# Patient Record
Sex: MALE | Race: AMERICAN INDIAN OR ALASKA NATIVE
[De-identification: names, ages, dates, MRNs, and addresses within clinical notes are randomized per-mention and may not be internally consistent; named-entity substitution may affect disease eponyms.]

---

## 2019-09-25 ENCOUNTER — HOSPITAL ENCOUNTER (EMERGENCY)
Dept: HOSPITAL 5 - ED | Age: 29
Discharge: HOME | End: 2019-09-25
Payer: SELF-PAY

## 2019-09-25 VITALS — DIASTOLIC BLOOD PRESSURE: 84 MMHG | SYSTOLIC BLOOD PRESSURE: 126 MMHG

## 2019-09-25 DIAGNOSIS — K52.9: Primary | ICD-10-CM

## 2019-09-25 DIAGNOSIS — G89.29: ICD-10-CM

## 2019-09-25 DIAGNOSIS — M54.89: ICD-10-CM

## 2019-09-25 DIAGNOSIS — E86.0: ICD-10-CM

## 2019-09-25 DIAGNOSIS — Z88.6: ICD-10-CM

## 2019-09-25 LAB
ALBUMIN SERPL-MCNC: 4.1 G/DL (ref 3.9–5)
ALT SERPL-CCNC: 13 UNITS/L (ref 7–56)
BASOPHILS # (AUTO): 0.1 K/MM3 (ref 0–0.1)
BASOPHILS NFR BLD AUTO: 1 % (ref 0–1.8)
BUN SERPL-MCNC: 8 MG/DL (ref 9–20)
BUN/CREAT SERPL: 13 %
CALCIUM SERPL-MCNC: 9.1 MG/DL (ref 8.4–10.2)
EOSINOPHIL # BLD AUTO: 0.1 K/MM3 (ref 0–0.4)
EOSINOPHIL NFR BLD AUTO: 1.9 % (ref 0–4.3)
HCT VFR BLD CALC: 40.6 % (ref 35.5–45.6)
HEMOLYSIS INDEX: 4
HGB BLD-MCNC: 13.3 GM/DL (ref 11.8–15.2)
LYMPHOCYTES # BLD AUTO: 1.6 K/MM3 (ref 1.2–5.4)
LYMPHOCYTES NFR BLD AUTO: 26.4 % (ref 13.4–35)
MCHC RBC AUTO-ENTMCNC: 33 % (ref 32–34)
MCV RBC AUTO: 92 FL (ref 84–94)
MONOCYTES # (AUTO): 0.4 K/MM3 (ref 0–0.8)
MONOCYTES % (AUTO): 6.9 % (ref 0–7.3)
PLATELET # BLD: 164 K/MM3 (ref 140–440)
RBC # BLD AUTO: 4.42 M/MM3 (ref 3.65–5.03)

## 2019-09-25 PROCEDURE — 83690 ASSAY OF LIPASE: CPT

## 2019-09-25 PROCEDURE — 96375 TX/PRO/DX INJ NEW DRUG ADDON: CPT

## 2019-09-25 PROCEDURE — 99284 EMERGENCY DEPT VISIT MOD MDM: CPT

## 2019-09-25 PROCEDURE — 96361 HYDRATE IV INFUSION ADD-ON: CPT

## 2019-09-25 PROCEDURE — 74177 CT ABD & PELVIS W/CONTRAST: CPT

## 2019-09-25 PROCEDURE — 96374 THER/PROPH/DIAG INJ IV PUSH: CPT

## 2019-09-25 PROCEDURE — 80053 COMPREHEN METABOLIC PANEL: CPT

## 2019-09-25 PROCEDURE — 36415 COLL VENOUS BLD VENIPUNCTURE: CPT

## 2019-09-25 PROCEDURE — 85025 COMPLETE CBC W/AUTO DIFF WBC: CPT

## 2019-09-25 NOTE — EVENT NOTE
ED Screening Note


Date of service: 09/25/19


Time: 12:50


ED Screening Note: 


28 y o male presents for acute on chronic back pain from scoliosis repair 

surgery a year ago and out of his HIV meds





This initial assessment/diagnostic orders/clinical plan/treatment(s) is/are 

subject to change based on patients health status, clinical progression and re-

assessment by fellow clinical providers in the ED. Further treatment and workup 

at subsequent clinical providers discretion. Patient/guardian urged not to elope

from the ED as their condition may be serious if not clinically assessed and 

managed. 





Initial orders include: 


ACC eval

## 2019-09-25 NOTE — CAT SCAN REPORT
CT ABDOMEN AND PELVIS WITH IV CONTRAST



INDICATION:

NVD abnd pain HIV positive.



COMPARISON:

None available.



TECHNIQUE:

All CT scans at this facility use dose modulation, automated exposure control, iterative reconstructi
on or weight based dosing, when appropriate, to reduce radiation dose to as low as reasonably achieva
ble.



FINDINGS:



Lung Bases: No significant abnormality.



Skeletal System: No acute abnormality.  Thoracolumbar scoliosis is noted with Whitehead rods noted t
hroughout.



ABDOMEN:

Liver: The liver is enlarged, this primarily involves the left lobe.

Gallbladder: No significant abnormality.  

Bile Ducts: No significant abnormality.

Pancreas: No significant abnormality.

Spleen: No significant abnormality.

Adrenals: No significant abnormality.

Right Kidney: No significant abnormality.

Left Kidney: Left renal pyramids are somewhat asymmetrically hypoechoic and mildly enlarged. No defin
able cortical lesions are seen. There is no hydronephrosis or perinephric stranding.

Upper GI tract: No significant abnormality.

Lymph Nodes: No significant adenopathy.

Aorta: No significant abnormality. 

Additional Findings: No significant abnormality.



PELVIS:

Colon: Normal .

Urinary Bladder and Distal Ureters: No significant abnormality.

Appendix: No significant abnormality.  

Lymph Nodes: No significant adenopathy.

Additional Findings: None.





IMPRESSION:

1.  Left renal pyramids are mildly asymmetrically hypoechoic and prominent. This is nonspecific and c
ould be chronic. This could be seen in the setting of mild left pyelonephritis; however, no cortical 
lesions or perinephric stranding is seen and there is no hydronephrosis. Correlate with urinalysis.

2.  Liver is mildly enlarged, this primarily involves the left hepatic lobe. No intrinsic liver lesio
ns are seen.



Signer Name: Oswaldo Lora MD 

Signed: 9/25/2019 4:15 PM

 Workstation Name: [id number]

## 2019-09-25 NOTE — EMERGENCY DEPARTMENT REPORT
ED General Adult HPI





- General


Chief complaint: Back Pain/Injury


Stated complaint: BACK PAIN


Time Seen by Provider: 09/25/19 12:48


Source: patient


Mode of arrival: Ambulatory


Limitations: No Limitations





- History of Present Illness


Initial comments: 





Patient is a 28-year-old -American male with a past medical history of 

HIV who is not compliant with his HIV medications who is complaining of back 

pain which is chronic but also nausea vomiting and mild diarrhea.  Patient's 

states he has some mild epigastric discomfort.  He denies fevers chills cough 

cold or congestion at this time.





- Related Data


                                  Previous Rx's











 Medication  Instructions  Recorded  Last Taken  Type


 


Dicyclomine [Bentyl] 20 mg PO QID #10 tablet 09/25/19 Unknown Rx


 


Ondansetron [Zofran Odt] 4 mg PO Q8HR #10 tab.rapdis 09/25/19 Unknown Rx


 


Sulfamethoxazole/Trimethoprim 1 each PO DAILY #30 tablet 09/25/19 Unknown Rx





[Bactrim DS TAB]    


 


traMADol [Ultram] 50 mg PO Q6HR PRN #12 tablet 09/25/19 Unknown Rx











                                    Allergies











Allergy/AdvReac Type Severity Reaction Status Date / Time


 


lisinopril Allergy  Angioedema Verified 09/25/19 12:48














ED Review of Systems


ROS: 


Stated complaint: BACK PAIN


Other details as noted in HPI





Comment: All other systems reviewed and negative





ED Past Medical Hx





- Past Medical History


Hx Hypertension: Yes


Hx HIV: Yes





- Surgical History


Past Surgical History?: Yes


Additional Surgical History: BACK SURGERY





- Social History


Smoking Status: Never Smoker


Substance Use Type: None





- Medications


Home Medications: 


                                Home Medications











 Medication  Instructions  Recorded  Confirmed  Last Taken  Type


 


Dicyclomine [Bentyl] 20 mg PO QID #10 tablet 09/25/19  Unknown Rx


 


Ondansetron [Zofran Odt] 4 mg PO Q8HR #10 tab.rapdis 09/25/19  Unknown Rx


 


Sulfamethoxazole/Trimethoprim 1 each PO DAILY #30 tablet 09/25/19  Unknown Rx





[Bactrim DS TAB]     


 


traMADol [Ultram] 50 mg PO Q6HR PRN #12 tablet 09/25/19  Unknown Rx














ED Physical Exam





- General


Limitations: No Limitations


General appearance: alert, in no apparent distress





- Head


Head exam: Present: atraumatic, normocephalic





- Eye


Eye exam: Present: normal appearance





- ENT


ENT exam: Present: mucous membranes moist





- Neck


Neck exam: Present: normal inspection





- Respiratory


Respiratory exam: Present: normal lung sounds bilaterally.  Absent: respiratory 

distress, wheezes, rales, rhonchi





- Cardiovascular


Cardiovascular Exam: Present: regular rate, normal rhythm, normal heart sounds. 

 Absent: systolic murmur, diastolic murmur, rubs, gallop





- GI/Abdominal


GI/Abdominal exam: Present: soft, normal bowel sounds.  Absent: distended, ten

derness, guarding, rebound





- Rectal


Rectal exam: Present: deferred





- Extremities Exam


Extremities exam: Present: normal inspection





- Back Exam


Back exam: Present: normal inspection





- Neurological Exam


Neurological exam: Present: alert, oriented X3





- Psychiatric


Psychiatric exam: Present: normal affect, normal mood





- Skin


Skin exam: Present: warm, dry, intact, normal color.  Absent: rash





ED Course


                                   Vital Signs











  09/25/19 09/25/19





  12:47 16:34


 


Temperature 98.5 F 


 


Pulse Rate 83 


 


Respiratory 16 18





Rate  


 


Blood Pressure 141/97 


 


O2 Sat by Pulse 100 





Oximetry  














ED Medical Decision Making





- Lab Data


Result diagrams: 


                                 09/25/19 13:41





                                 09/25/19 13:41








                                   Lab Results











  09/25/19 09/25/19 Range/Units





  13:41 13:41 


 


WBC  6.2   (4.5-11.0)  K/mm3


 


RBC  4.42   (3.65-5.03)  M/mm3


 


Hgb  13.3   (11.8-15.2)  gm/dl


 


Hct  40.6   (35.5-45.6)  %


 


MCV  92   (84-94)  fl


 


MCH  30   (28-32)  pg


 


MCHC  33   (32-34)  %


 


RDW  14.6   (13.2-15.2)  %


 


Plt Count  164   (140-440)  K/mm3


 


Lymph % (Auto)  26.4   (13.4-35.0)  %


 


Mono % (Auto)  6.9   (0.0-7.3)  %


 


Eos % (Auto)  1.9   (0.0-4.3)  %


 


Baso % (Auto)  1.0   (0.0-1.8)  %


 


Lymph #  1.6   (1.2-5.4)  K/mm3


 


Mono #  0.4   (0.0-0.8)  K/mm3


 


Eos #  0.1   (0.0-0.4)  K/mm3


 


Baso #  0.1   (0.0-0.1)  K/mm3


 


Seg Neutrophils %  63.8   (40.0-70.0)  %


 


Seg Neutrophils #  3.9   (1.8-7.7)  K/mm3


 


Sodium   138  (137-145)  mmol/L


 


Potassium   4.5  (3.6-5.0)  mmol/L


 


Chloride   103.7  ()  mmol/L


 


Carbon Dioxide   25  (22-30)  mmol/L


 


Anion Gap   14  mmol/L


 


BUN   8 L  (9-20)  mg/dL


 


Creatinine   0.6 L  (0.8-1.5)  mg/dL


 


Estimated GFR   > 60  ml/min


 


BUN/Creatinine Ratio   13  %


 


Glucose   87  ()  mg/dL


 


Calcium   9.1  (8.4-10.2)  mg/dL


 


Total Bilirubin   0.40  (0.1-1.2)  mg/dL


 


AST   18  (5-40)  units/L


 


ALT   13  (7-56)  units/L


 


Alkaline Phosphatase   104  ()  units/L


 


Total Protein   8.0  (6.3-8.2)  g/dL


 


Albumin   4.1  (3.9-5)  g/dL


 


Albumin/Globulin Ratio   1.1  %


 


Lipase   26  (13-60)  units/L














- Medical Decision Making





Is was hydrated and given antiemetics and intact his nausea has improved.  He is

 tolerating by mouth.  CT is non-conclusive.  There is no acute findings that 

are consistent with a surgical emergency.  Patient likely with a gastroenteritis

 and will be discharged home.


Critical care attestation.: 


If time is entered above; I have spent that time in minutes in the direct care 

of this critically ill patient, excluding procedure time.








ED Disposition


Clinical Impression: 


 Gastroenteritis, Mild dehydration, Chronic back pain





Disposition: DC-01 TO HOME OR SELFCARE


Is pt being admited?: No


Does the pt Need Aspirin: No


Condition: Stable


Instructions:  Gastroenteritis (ED)


Referrals: 


[facility], MD [Referring] - 3-5 Days


Time of Disposition: 16:44